# Patient Record
(demographics unavailable — no encounter records)

---

## 2017-07-19 NOTE — MR
EXAMINATION TYPE: MR lumbar spine wo con

 

DATE OF EXAM: 7/19/2017

 

COMPARISON: NONE

 

HISTORY: Low back pain per order. Back pain into right buttocks for years per patient.

 

TECHNIQUE: Multiplanar, multisequence imaging of the lumbar spine is performed without IV contrast.

 

FINDINGS: Sagittal images of the lumbar spine show vertebral body heights and alignment to appear sat
isfactory. There is disc desiccation noted at L5-S1 level with posterior disc herniation seen on sagi
ttal images otherwise the intervertebral discs demonstrate normal heights and hydration.  The conus m
edullaris is normal in position and signal ending at mid L1 level.  The bone marrow signal intensity 
is within normal limits. No significant spurring is seen.

 

Axial images show the T12-L1, L1-L2, L2-L3, and L3-L4 levels all to appear within normal limits.

 

Axial images at L4-L5 level show mild bilateral facet degenerative changes and ligamentum flavum hype
rtrophy. Spinal canal is preserved and bilateral neural foramina are patent.

 

Axial images at L5-S1 level show mild facet degenerative changes bilaterally. There is broad-based ri
ght paracentral disc protrusion but spinal canal is preserved and bilateral neural neural foramina ar
e felt patent.

 

No suspicious retroperitoneal findings are identified.

 

IMPRESSION: Some mild multilevel degenerative changes in the lumbar spine as detailed above, disc her
niation L5-S1 level is present.